# Patient Record
Sex: FEMALE | Race: WHITE | NOT HISPANIC OR LATINO | URBAN - METROPOLITAN AREA
[De-identification: names, ages, dates, MRNs, and addresses within clinical notes are randomized per-mention and may not be internally consistent; named-entity substitution may affect disease eponyms.]

---

## 2020-09-30 ENCOUNTER — OFFICE VISIT (OUTPATIENT)
Dept: URBAN - METROPOLITAN AREA CLINIC 128 | Facility: CLINIC | Age: 75
End: 2020-09-30
Payer: MEDICARE

## 2020-09-30 DIAGNOSIS — Z12.11 COLON CANCER SCREENING: ICD-10-CM

## 2020-09-30 DIAGNOSIS — A09 INFECTIOUS COLITIS: ICD-10-CM

## 2020-09-30 DIAGNOSIS — K60.2 ANAL FISSURE: ICD-10-CM

## 2020-09-30 DIAGNOSIS — K60.1 CHRONIC ANAL FISSURE: ICD-10-CM

## 2020-09-30 DIAGNOSIS — Z80.0 FAMILY HISTORY OF COLON CANCER: ICD-10-CM

## 2020-09-30 PROCEDURE — 46611 ANOSCOPY: CPT | Performed by: INTERNAL MEDICINE

## 2020-09-30 PROCEDURE — 99204 OFFICE O/P NEW MOD 45 MIN: CPT | Performed by: INTERNAL MEDICINE

## 2020-09-30 PROCEDURE — 99244 OFF/OP CNSLTJ NEW/EST MOD 40: CPT | Performed by: INTERNAL MEDICINE

## 2020-09-30 RX ORDER — SODIUM, POTASSIUM,MAG SULFATES 17.5-3.13G
354ML SOLUTION, RECONSTITUTED, ORAL ORAL
Qty: 354 MILLILITER | Refills: 0 | OUTPATIENT
Start: 2020-09-30

## 2020-09-30 NOTE — PHYSICAL EXAM GASTROINTESTINAL
Abdomen , soft, nontender, nondistended , no guarding or rigidity , no masses palpable , normal bowel sounds , Liver and Spleen , no hepatomegaly present.  Rectal , normal sphincter tone , no external hemorrhoids.  small anterior skin tag.  jeison with no anal fissure.  anoscopy with no noted fissure and grade II internal hemorrhoids.  no rectal masses or bleeding present

## 2020-09-30 NOTE — HPI-TODAY'S VISIT:
pt comes in for anal fissure/pain and change in bowel habits saw her pcp 1 month ago for rectal pain with exam noting rectal tear she was given a rectal ointment with some improvement  she comes in for abd pain wtih diarrhea 3 weeks she notes 1-2 bm/day that are soft no blood or mucus assoc abd cramping no fever or chills appetite is normal improving but not normal irritated by dairy improved with pepto stable weight  last colon in 2015 with diverticulosis and tortuous colon.  mom with colon cancer.  5 yr f/u given

## 2020-10-01 LAB
A/G RATIO: 2.1
ALBUMIN: 4.7
ALKALINE PHOSPHATASE: 81
ALT (SGPT): 18
AST (SGOT): 19
BASO (ABSOLUTE): 0
BASOS: 0
BILIRUBIN, TOTAL: 0.4
BUN/CREATININE RATIO: 33
BUN: 23
CALCIUM: 10.6
CARBON DIOXIDE, TOTAL: 25
CHLORIDE: 100
CREATININE: 0.7
EGFR IF AFRICN AM: 99
EGFR IF NONAFRICN AM: 86
EOS (ABSOLUTE): 0.2
EOS: 2
GLOBULIN, TOTAL: 2.2
GLUCOSE: 81
HEMATOCRIT: 39.9
HEMATOLOGY COMMENTS:: (no result)
HEMOGLOBIN: 13.5
IMMATURE CELLS: (no result)
IMMATURE GRANS (ABS): 0
IMMATURE GRANULOCYTES: 0
LYMPHS (ABSOLUTE): 2.1
LYMPHS: 31
MCH: 31.9
MCHC: 33.8
MCV: 94
MONOCYTES(ABSOLUTE): 0.5
MONOCYTES: 8
NEUTROPHILS (ABSOLUTE): 3.9
NEUTROPHILS: 59
NRBC: (no result)
PLATELETS: 212
POTASSIUM: 4.2
PROTEIN, TOTAL: 6.9
RBC: 4.23
RDW: 12.4
SODIUM: 140
WBC: 6.7

## 2020-10-05 ENCOUNTER — TELEPHONE ENCOUNTER (OUTPATIENT)
Dept: URBAN - METROPOLITAN AREA CLINIC 92 | Facility: CLINIC | Age: 75
End: 2020-10-05

## 2020-10-07 ENCOUNTER — OFFICE VISIT (OUTPATIENT)
Dept: URBAN - METROPOLITAN AREA LAB 2 | Facility: LAB | Age: 75
End: 2020-10-07
Payer: MEDICARE

## 2020-10-07 DIAGNOSIS — Z80.0 FAMILY HISTORY MALIGNANT NEOPLASM OF BILIARY TRACT: ICD-10-CM

## 2020-10-07 PROCEDURE — G0105 COLORECTAL SCRN; HI RISK IND: HCPCS | Performed by: INTERNAL MEDICINE

## 2020-12-02 ENCOUNTER — OFFICE VISIT (OUTPATIENT)
Dept: URBAN - METROPOLITAN AREA SURGERY CENTER 31 | Facility: SURGERY CENTER | Age: 75
End: 2020-12-02

## 2022-01-25 ENCOUNTER — OFFICE VISIT (OUTPATIENT)
Dept: URBAN - METROPOLITAN AREA CLINIC 19 | Facility: CLINIC | Age: 77
End: 2022-01-25
Payer: MEDICARE

## 2022-01-25 ENCOUNTER — WEB ENCOUNTER (OUTPATIENT)
Dept: URBAN - METROPOLITAN AREA CLINIC 19 | Facility: CLINIC | Age: 77
End: 2022-01-25

## 2022-01-25 ENCOUNTER — TELEPHONE ENCOUNTER (OUTPATIENT)
Dept: URBAN - METROPOLITAN AREA CLINIC 19 | Facility: CLINIC | Age: 77
End: 2022-01-25

## 2022-01-25 DIAGNOSIS — R13.19 ESOPHAGEAL DYSPHAGIA: ICD-10-CM

## 2022-01-25 PROCEDURE — 99204 OFFICE O/P NEW MOD 45 MIN: CPT | Performed by: INTERNAL MEDICINE

## 2022-01-25 RX ORDER — SODIUM, POTASSIUM,MAG SULFATES 17.5-3.13G
354ML SOLUTION, RECONSTITUTED, ORAL ORAL
Qty: 354 MILLILITER | Refills: 0 | Status: DISCONTINUED | COMMUNITY
Start: 2020-09-30

## 2022-01-25 RX ORDER — LEVOTHYROXINE SODIUM 50 UG/1
1 TABLET IN THE MORNING ON AN EMPTY STOMACH TABLET ORAL ONCE A DAY
Status: ACTIVE | COMMUNITY

## 2022-01-25 NOTE — HPI-TODAY'S VISIT:
Ms. Robles is a 76 yr old woman new patient referred by Dr.Ryan Cary . She had cervical fusion with plate placement Oct 2018. She had to have a followup surgery June 2020.  She has persistent neck pain and feels like a huge ball in her throat all the time.   removed two thyroid nodules a few years ago which made her swallowing intially better after her surgery until after her second neck surgery  when her swallowing.   Colonoscopy Oct 2020-  - diverticulosis of sigmoid colon. Family history of mother with colon cancer.

## 2022-01-27 ENCOUNTER — TELEPHONE ENCOUNTER (OUTPATIENT)
Dept: URBAN - METROPOLITAN AREA CLINIC 19 | Facility: CLINIC | Age: 77
End: 2022-01-27

## 2022-02-03 ENCOUNTER — LAB OUTSIDE AN ENCOUNTER (OUTPATIENT)
Dept: URBAN - METROPOLITAN AREA CLINIC 19 | Facility: CLINIC | Age: 77
End: 2022-02-03

## 2022-02-22 ENCOUNTER — OFFICE VISIT (OUTPATIENT)
Dept: URBAN - METROPOLITAN AREA CLINIC 19 | Facility: CLINIC | Age: 77
End: 2022-02-22

## 2022-02-23 ENCOUNTER — OFFICE VISIT (OUTPATIENT)
Dept: URBAN - METROPOLITAN AREA CLINIC 19 | Facility: CLINIC | Age: 77
End: 2022-02-23

## 2022-03-14 ENCOUNTER — OFFICE VISIT (OUTPATIENT)
Dept: URBAN - METROPOLITAN AREA CLINIC 19 | Facility: CLINIC | Age: 77
End: 2022-03-14

## 2022-09-12 ENCOUNTER — TELEPHONE ENCOUNTER (OUTPATIENT)
Dept: URBAN - METROPOLITAN AREA CLINIC 19 | Facility: CLINIC | Age: 77
End: 2022-09-12

## 2022-09-12 ENCOUNTER — OFFICE VISIT (OUTPATIENT)
Dept: URBAN - METROPOLITAN AREA CLINIC 19 | Facility: CLINIC | Age: 77
End: 2022-09-12
Payer: MEDICARE

## 2022-09-12 VITALS
TEMPERATURE: 98.4 F | HEART RATE: 69 BPM | HEIGHT: 63 IN | SYSTOLIC BLOOD PRESSURE: 124 MMHG | DIASTOLIC BLOOD PRESSURE: 68 MMHG

## 2022-09-12 DIAGNOSIS — K21.9 GERD: ICD-10-CM

## 2022-09-12 DIAGNOSIS — R13.12 OROPHARYNGEAL DYSPHAGIA: ICD-10-CM

## 2022-09-12 PROBLEM — 71457002 OROPHARYNGEAL DYSPHAGIA: Status: ACTIVE | Noted: 2022-09-12

## 2022-09-12 PROCEDURE — 99214 OFFICE O/P EST MOD 30 MIN: CPT | Performed by: INTERNAL MEDICINE

## 2022-09-12 RX ORDER — FAMOTIDINE 20 MG/1
1 TABLET AT BEDTIME AS NEEDED TABLET, FILM COATED ORAL ONCE A DAY
Qty: 90 TABLET | Refills: 1 | OUTPATIENT
Start: 2022-09-12

## 2022-09-12 RX ORDER — PANTOPRAZOLE SODIUM 40 MG/1
1 TABLET TABLET, DELAYED RELEASE ORAL
Qty: 180 TABLET | Refills: 1 | OUTPATIENT
Start: 2022-09-12

## 2022-09-12 RX ORDER — LEVOTHYROXINE SODIUM 50 UG/1
1 TABLET IN THE MORNING ON AN EMPTY STOMACH TABLET ORAL ONCE A DAY
Status: ACTIVE | COMMUNITY

## 2022-09-12 NOTE — HPI-TODAY'S VISIT:
9/30/20 (Dr. Osmar Monsivais): pt comes in for anal fissure/pain and change in bowel habits saw her pcp 1 month ago for rectal pain with exam noting rectal tear she was given a rectal ointment with some improvement  she comes in for abd pain wtih diarrhea 3 weeks she notes 1-2 bm/day that are soft no blood or mucus assoc abd cramping no fever or chills appetite is normal improving but not normal irritated by dairy improved with pepto stable weight  last colon in 2015 with diverticulosis and tortuous colon.  mom with colon cancer.  5 yr f/u given  1/25/22 (Dr. Jayne Stephenson): Ms. Robles is a 76 yr old woman new patient referred by Dr.Ryan Cary . She had cervical fusion with plate placement Oct 2018. She had to have a followup surgery June 2020.  She has persistent neck pain and feels like a huge ball in her throat all the time.   removed two thyroid nodules a few years ago which made her swallowing intially better after her surgery until after her second neck surgery  when her swallowing.   Colonoscopy Oct 2020-  - diverticulosis of sigmoid colon. Family history of mother with colon cancer.  9/12/22:  Patient presents today for evaluation of oropharyngeal dysphagia.  The patient had cervical spinal surgery that apparently caused her severe neck pain.  She saw Dr. Ren for the surgery, but she eventually saw Dr. Terrazas (sp?) and Dr. Tuttle.  Dr. Tuttle ordered multiple imaging studies - the patient reports that she was told that her neck pain was muscle strain (?) and required physical therapy.  Her physical therapist apparently was "very aggressive" and caused her tongue to lift up to the roof of her mouth (per patient).  She also started to have heartburn, which she had never had before (?).  She was given prescriptions for omeprazole and pantoprazole and told to take famotidine at night.  She now has difficulty swallowing, mainly pills.  She has no more heartburn.  She has hoarseness - used to see Dr. Puma Cary but has not seen him for months.

## 2022-09-16 ENCOUNTER — TELEPHONE ENCOUNTER (OUTPATIENT)
Dept: URBAN - METROPOLITAN AREA CLINIC 19 | Facility: CLINIC | Age: 77
End: 2022-09-16

## 2022-09-28 ENCOUNTER — LAB OUTSIDE AN ENCOUNTER (OUTPATIENT)
Dept: URBAN - METROPOLITAN AREA CLINIC 19 | Facility: CLINIC | Age: 77
End: 2022-09-28

## 2022-10-06 ENCOUNTER — OFFICE VISIT (OUTPATIENT)
Dept: URBAN - METROPOLITAN AREA CLINIC 19 | Facility: CLINIC | Age: 77
End: 2022-10-06
Payer: MEDICARE

## 2022-10-06 VITALS
HEIGHT: 63 IN | WEIGHT: 194.4 LBS | TEMPERATURE: 98 F | BODY MASS INDEX: 34.45 KG/M2 | SYSTOLIC BLOOD PRESSURE: 120 MMHG | DIASTOLIC BLOOD PRESSURE: 80 MMHG | HEART RATE: 61 BPM

## 2022-10-06 DIAGNOSIS — Q38.3 TONGUE ANOMALY: ICD-10-CM

## 2022-10-06 DIAGNOSIS — K21.9 GASTROESOPHAGEAL REFLUX DISEASE, UNSPECIFIED WHETHER ESOPHAGITIS PRESENT: ICD-10-CM

## 2022-10-06 PROBLEM — 235595009: Status: ACTIVE | Noted: 2022-10-06

## 2022-10-06 PROCEDURE — 99214 OFFICE O/P EST MOD 30 MIN: CPT | Performed by: INTERNAL MEDICINE

## 2022-10-06 RX ORDER — FAMOTIDINE 20 MG/1
1 TABLET AT BEDTIME AS NEEDED TABLET, FILM COATED ORAL ONCE A DAY
Qty: 90 TABLET | Refills: 1 | Status: ACTIVE | COMMUNITY
Start: 2022-09-12

## 2022-10-06 RX ORDER — PANTOPRAZOLE SODIUM 40 MG/1
1 TABLET TABLET, DELAYED RELEASE ORAL
Qty: 180 TABLET | Refills: 1 | Status: ACTIVE | COMMUNITY
Start: 2022-09-12

## 2022-10-06 RX ORDER — LEVOTHYROXINE SODIUM 50 UG/1
1 TABLET IN THE MORNING ON AN EMPTY STOMACH TABLET ORAL ONCE A DAY
Status: ACTIVE | COMMUNITY

## 2022-10-06 NOTE — HPI-TODAY'S VISIT:
Mrs. Robles is a 77 year old female who was last seen in GI clinic by Dr. Stephenson on 1/25/2022 and Dr. Melendez on 9/12/2022.   She had a MBS which showed "the patient exhibs normal oral phase and normal pharyngeal phases of swallowing. The patient has severa. complains which are unrelated. She believes that her tongue is possibly damaged from ACDF surgery. Explained that her speech is 100% intelligible and there is no evidence that her tongue has flo dysfunction. She does complain of globus which may be related to GERD. No reflux was noted on study."   She reports she is going to have lower back surgery on 11/10/2022. She reports she will be seeing Anesthesia for preop evaluation end of October 2022.   She wants to know how to help with night time reflux and food triggers which may induce reflux.   Prior history is summarized below: -1/25/22 (Dr. Jayne Stephenson): Ms. Robles is a 76 yr old woman new patient referred by Dr.Ryan Cary . She had cervical fusion with plate placement Oct 2018. She had to have a followup surgery June 2020.  She has persistent neck pain and feels like a huge ball in her throat all the time.   removed two thyroid nodules a few years ago which made her swallowing intially better after her surgery until after her second neck surgery  when her swallowing.  -Oct 2020 Colonoscopy-  - diverticulosis of sigmoid colon. -9/12/22:  Patient presents today for evaluation of oropharyngeal dysphagia.  The patient had cervical spinal surgery that apparently caused her severe neck pain.  She saw Dr. Ren for the surgery, but she eventually saw Dr. Terrazas (sp?) and Dr. Tuttle.  Dr. Tuttle ordered multiple imaging studies - the patient reports that she was told that her neck pain was muscle strain (?) and required physical therapy.  Her physical therapist apparently was "very aggressive" and caused her tongue to lift up to the roof of her mouth (per patient).  She also started to have heartburn, which she had never had before (?).  She was given prescriptions for omeprazole and   pantoprazole and told to take famotidine at night.  She now has difficulty swallowing, mainly pills.  She has no more heartburn.  She has hoarseness - used to see Dr. Puma Cary but has not seen him for months.

## 2022-11-29 ENCOUNTER — TELEPHONE ENCOUNTER (OUTPATIENT)
Dept: URBAN - METROPOLITAN AREA CLINIC 19 | Facility: CLINIC | Age: 77
End: 2022-11-29

## 2022-12-16 ENCOUNTER — OFFICE VISIT (OUTPATIENT)
Dept: URBAN - METROPOLITAN AREA CLINIC 19 | Facility: CLINIC | Age: 77
End: 2022-12-16

## 2022-12-29 ENCOUNTER — TELEPHONE ENCOUNTER (OUTPATIENT)
Dept: URBAN - METROPOLITAN AREA CLINIC 96 | Facility: CLINIC | Age: 77
End: 2022-12-29

## 2022-12-29 RX ORDER — PANTOPRAZOLE SODIUM 40 MG/1
1 TABLET TABLET, DELAYED RELEASE ORAL
Qty: 60 TABLET | Refills: 0
Start: 2022-09-12

## 2022-12-29 RX ORDER — FAMOTIDINE 20 MG/1
1 TABLET AT BEDTIME AS NEEDED TABLET, FILM COATED ORAL ONCE A DAY
Qty: 30 TABLET | Refills: 0
Start: 2022-09-12

## 2022-12-30 ENCOUNTER — TELEPHONE ENCOUNTER (OUTPATIENT)
Dept: URBAN - METROPOLITAN AREA CLINIC 19 | Facility: CLINIC | Age: 77
End: 2022-12-30

## 2022-12-30 RX ORDER — PANTOPRAZOLE SODIUM 40 MG/1
1 TABLET TABLET, DELAYED RELEASE ORAL TWICE A DAY
Qty: 60 | Refills: 0
Start: 2022-09-12

## 2022-12-30 RX ORDER — FAMOTIDINE 20 MG/1
1 TABLET TABLET, FILM COATED ORAL TWICE A DAY
Qty: 60 | Refills: 0
Start: 2022-09-12

## 2023-01-03 ENCOUNTER — TELEPHONE ENCOUNTER (OUTPATIENT)
Dept: URBAN - METROPOLITAN AREA CLINIC 19 | Facility: CLINIC | Age: 78
End: 2023-01-03

## 2023-01-03 ENCOUNTER — TELEPHONE ENCOUNTER (OUTPATIENT)
Dept: URBAN - METROPOLITAN AREA CLINIC 128 | Facility: CLINIC | Age: 78
End: 2023-01-03

## 2023-01-03 ENCOUNTER — ERX REFILL RESPONSE (OUTPATIENT)
Dept: URBAN - METROPOLITAN AREA CLINIC 19 | Facility: CLINIC | Age: 78
End: 2023-01-03

## 2023-01-03 RX ORDER — FAMOTIDINE 20 MG/1
1 TABLET TABLET, FILM COATED ORAL TWICE A DAY
Qty: 180 TABLET | Refills: 3 | OUTPATIENT

## 2023-01-03 RX ORDER — FAMOTIDINE 20 MG/1
TAKE ONE TABLET BY MOUTH ONE TIME DAILY AT BEDTIME AS NEEDED TABLET, FILM COATED ORAL
Qty: 90 TABLET | Refills: 3 | OUTPATIENT

## 2023-01-03 RX ORDER — FAMOTIDINE 20 MG/1
1 TABLET TABLET, FILM COATED ORAL TWICE A DAY
Qty: 60 | Refills: 0 | OUTPATIENT

## 2023-01-31 ENCOUNTER — DASHBOARD ENCOUNTERS (OUTPATIENT)
Age: 78
End: 2023-01-31

## 2023-02-06 ENCOUNTER — OFFICE VISIT (OUTPATIENT)
Dept: URBAN - METROPOLITAN AREA CLINIC 19 | Facility: CLINIC | Age: 78
End: 2023-02-06
Payer: MEDICARE

## 2023-02-06 DIAGNOSIS — K21.9 GERD: ICD-10-CM

## 2023-02-06 PROBLEM — 235595009 GASTROESOPHAGEAL REFLUX DISEASE: Status: ACTIVE | Noted: 2022-09-12

## 2023-02-06 PROCEDURE — 99213 OFFICE O/P EST LOW 20 MIN: CPT | Performed by: INTERNAL MEDICINE

## 2023-02-06 RX ORDER — PANTOPRAZOLE SODIUM 40 MG/1
1 TABLET TABLET, DELAYED RELEASE ORAL TWICE A DAY
Qty: 60 | Refills: 0 | Status: ACTIVE | COMMUNITY
Start: 2022-09-12

## 2023-02-06 RX ORDER — PANTOPRAZOLE SODIUM 40 MG/1
1 TABLET TABLET, DELAYED RELEASE ORAL TWICE A DAY
Qty: 180 | Refills: 3
Start: 2022-09-12

## 2023-02-06 RX ORDER — FAMOTIDINE 20 MG/1
TAKE ONE TABLET BY MOUTH ONE TIME DAILY AT BEDTIME AS NEEDED TABLET, FILM COATED ORAL
Qty: 90 TABLET | Refills: 3 | Status: ACTIVE | COMMUNITY

## 2023-02-06 RX ORDER — FAMOTIDINE 20 MG/1
TAKE ONE TABLET BY MOUTH ONE TIME DAILY AT BEDTIME AS NEEDED TABLET, FILM COATED ORAL ONCE A DAY
Qty: 90 | Refills: 3

## 2023-02-06 RX ORDER — LEVOTHYROXINE SODIUM 50 UG/1
1 TABLET IN THE MORNING ON AN EMPTY STOMACH TABLET ORAL ONCE A DAY
Status: ACTIVE | COMMUNITY

## 2023-02-06 NOTE — HPI-TODAY'S VISIT:
10/6/22 (Dr. Shree Smith): Mrs. Robles is a 77 year old female who was last seen in GI clinic by Dr. Stephenson on 1/25/2022 and Dr. Melendez on 9/12/2022.   She had a MBS which showed "the patient exhibs normal oral phase and normal pharyngeal phases of swallowing. The patient has severa. complains which are unrelated. She believes that her tongue is possibly damaged from ACDF surgery. Explained that her speech is 100% intelligible and there is no evidence that her tongue has flo dysfunction. She does complain of globus which may be related to GERD. No reflux was noted on study."   She reports she is going to have lower back surgery on 11/10/2022. She reports she will be seeing Anesthesia for preop evaluation end of October 2022.   She wants to know how to help with night time reflux and food triggers which may induce reflux.   Prior history is summarized below: -1/25/22 (Dr. Jayne Stephenson): Ms. Robles is a 76 yr old woman new patient referred by Dr.Ryan Cary . She had cervical fusion with plate placement Oct 2018. She had to have a followup surgery June 2020.  She has persistent neck pain and feels like a huge ball in her throat all the time.   removed two thyroid nodules a few years ago which made her swallowing intially better after her surgery until after her second neck surgery  when her swallowing.  -Oct 2020 Colonoscopy-  - diverticulosis of sigmoid colon. -9/12/22:  Patient presents today for evaluation of oropharyngeal dysphagia.  The patient had cervical spinal surgery that apparently caused her severe neck pain.  She saw Dr. Ren for the surgery, but she eventually saw Dr. Terrazas (sp?) and Dr. Tuttle.  Dr. Tuttle ordered multiple imaging studies - the patient reports that she was told that her neck pain was muscle strain (?) and required physical therapy.  Her physical therapist apparently was "very aggressive" and caused her tongue to lift up to the roof of her mouth (per patient).  She also started to have heartburn, which she had never had before (?).  She was given prescriptions for omeprazole and   pantoprazole and told to take famotidine at night.  She now has difficulty swallowing, mainly pills.  She has no more heartburn.  She has hoarseness - used to see Dr. Puma Cary but has not seen him for months.  2/6/23: Patient returns to my clinic for issues related to GERD. She states that she had back surgery recently, after which time she had significant dysgeusia and reflux. She actually feels better from that standpoint - back on pantoprazole/famotidine. Her other concern is intermittent right neck swelling and jaw pain. She has been cleared from Dr. Michael Harry for any vascular issues. She has not followed up with ENT - she has not been satisfied with ENT evaluations in the past. I suggested an oral surgeon - she would like a referral from her PCP.

## 2023-04-20 NOTE — PHYSICAL EXAM CHEST:
DISCHARGE from the Hospital to Home  Make sure you go home with all medication prescriptions.  Review your medication list with the nurse prior to leaving the hospital.  Schedule a post-op clinic appointment for 2-4 weeks after procedure. To make an appointment please call: [phone]    Patients will have Follow-Up Appointment post-surgery or procedure with a Physician Assistant or Advanced Nurse Practitioner.    POST-OP Surgical Incision and Puncture Site General Instructions  Incision Care: Please continue to shower daily when you get home. Gently pat the incision dry. Do not soak in the bathtub. Do not apply ointment or lotion to your incision.   Infection: Continue to monitor the incision daily for redness, drainage, or foul-smelling odor coming from the incision site. If any of these issues arise please call the office and request to speak to the RN or person on call if this is after hours.    For any incision, CALL if you develop a fever greater than 100°F  Restrictions: Continue to follow your post-operative restrictions of no bending, twisting, or lifting over 5-10 pounds for 7 days (depending on where your incision site is). Slowly advance your activity as tolerated.   Medication Refills: To request a refill of your prescription please call the office 2-3 days before your medication will run out. Our office hours are Monday-Friday 8:30am-5:00pm. All prescriptions will be filled within 48 hours. If a refill request is received on a Friday after 4:00pm it will be completed by 12:00pm the following Monday.    Radial Artery Puncture Sites (on the wrist):  Incision Care:  Bandage can be removed on Day 1 after procedure  You can shower 24 hours post procedure  Do not apply any powders, lotions, or ointments onto the site until it is healed  Contact our office if you have:  Redness, swelling, or drainage from the puncture site  Pain in your arm (tenderness is expected at the site)  Numbness or tingling of the  no lesions,  no deformities,  no traumatic injuries,  no significant scars are present,  chest wall non-tender,  no masses present, breathing is unlabored without accessory muscle use, normal breath sounds arm or hand  Develop a fever    Infection:    It is an EMERGENCY if you develop:  A sudden onset of pain or swelling at the puncture site  You can visibly see swelling in the wrist or up the arm  You should HOLD IMMEDIATE PRESSURE TO THE SITE and CALL 911    Restrictions:  NO soaking incision: no baths, swimming pools, hot tubs for 7 days  Minimize hand/wrist movement for 24 hours post procedure  NO heavy lifting greater than 5 pounds, pulling, repetitive movements of the affected wrist x 5 days

## 2024-08-29 ENCOUNTER — OFFICE VISIT (OUTPATIENT)
Dept: URBAN - METROPOLITAN AREA CLINIC 23 | Facility: CLINIC | Age: 79
End: 2024-08-29

## 2024-08-29 VITALS
BODY MASS INDEX: 26.58 KG/M2 | SYSTOLIC BLOOD PRESSURE: 129 MMHG | WEIGHT: 150 LBS | TEMPERATURE: 97.5 F | HEART RATE: 60 BPM | DIASTOLIC BLOOD PRESSURE: 80 MMHG | HEIGHT: 63 IN

## 2024-08-29 RX ORDER — FUROSEMIDE 10 MG/ML
2 ML SOLUTION ORAL ONCE A DAY
Status: ACTIVE | COMMUNITY

## 2024-08-29 RX ORDER — FAMOTIDINE 20 MG/1
TAKE ONE TABLET BY MOUTH ONE TIME DAILY AT BEDTIME AS NEEDED TABLET, FILM COATED ORAL ONCE A DAY
Qty: 90 | Refills: 3 | Status: ON HOLD | COMMUNITY

## 2024-08-29 RX ORDER — PANTOPRAZOLE SODIUM 40 MG/1
1 TABLET TABLET, DELAYED RELEASE ORAL TWICE A DAY
Qty: 180 | Refills: 3 | Status: ON HOLD | COMMUNITY
Start: 2022-09-12

## 2024-08-29 RX ORDER — MELOXICAM 15 MG/1
1 TABLET TABLET ORAL ONCE A DAY
Status: ACTIVE | COMMUNITY

## 2024-08-29 RX ORDER — LEVOTHYROXINE SODIUM 50 UG/1
1 TABLET IN THE MORNING ON AN EMPTY STOMACH TABLET ORAL ONCE A DAY
Status: ACTIVE | COMMUNITY

## 2024-08-29 RX ORDER — ALUMINUM ZIRCONIUM OCTACHLOROHYDREX GLY 16 G/100G
AS DIRECTED GEL TOPICAL
Status: ACTIVE | COMMUNITY

## 2024-08-29 RX ORDER — CLINDAMYCIN HYDROCHLORIDE 150 MG/1
3 CAPSULES CAPSULE ORAL
Status: ACTIVE | COMMUNITY

## 2024-08-29 RX ORDER — MAGNESIUM OXIDE/MAG AA CHELATE 300 MG
1 CAPSULE WITH A MEAL CAPSULE ORAL ONCE A DAY
Status: ACTIVE | COMMUNITY

## 2024-08-29 NOTE — HPI-TODAY'S VISIT:
79F On ozempic Cards--[facility]--Dr Fritz FH of CA colon--[facility] [place] COLON 2020--sigmoid tics. tortuous colon . Here to schedule screening colon Still very active Echo

## 2024-08-30 ENCOUNTER — OFFICE VISIT (OUTPATIENT)
Dept: URBAN - METROPOLITAN AREA CLINIC 19 | Facility: CLINIC | Age: 79
End: 2024-08-30

## 2025-08-21 ENCOUNTER — OFFICE VISIT (OUTPATIENT)
Dept: URBAN - METROPOLITAN AREA CLINIC 23 | Facility: CLINIC | Age: 80
End: 2025-08-21

## 2025-08-21 RX ORDER — FAMOTIDINE 20 MG/1
TAKE ONE TABLET BY MOUTH ONE TIME DAILY AT BEDTIME AS NEEDED TABLET, FILM COATED ORAL ONCE A DAY
Qty: 90 | Refills: 3 | Status: ON HOLD | COMMUNITY

## 2025-08-21 RX ORDER — PANTOPRAZOLE SODIUM 40 MG/1
1 TABLET TABLET, DELAYED RELEASE ORAL TWICE A DAY
Qty: 180 | Refills: 3 | Status: ON HOLD | COMMUNITY
Start: 2022-09-12

## 2025-08-21 RX ORDER — FUROSEMIDE 10 MG/ML
2 ML SOLUTION ORAL ONCE A DAY
Status: ACTIVE | COMMUNITY

## 2025-08-21 RX ORDER — MAGNESIUM OXIDE/MAG AA CHELATE 300 MG
1 CAPSULE WITH A MEAL CAPSULE ORAL ONCE A DAY
Status: ACTIVE | COMMUNITY

## 2025-08-21 RX ORDER — CLINDAMYCIN HYDROCHLORIDE 150 MG/1
3 CAPSULES CAPSULE ORAL
Status: ACTIVE | COMMUNITY

## 2025-08-21 RX ORDER — ALUMINUM ZIRCONIUM OCTACHLOROHYDREX GLY 16 G/100G
AS DIRECTED GEL TOPICAL
Status: ACTIVE | COMMUNITY

## 2025-08-21 RX ORDER — MELOXICAM 15 MG/1
1 TABLET TABLET ORAL ONCE A DAY
Status: ACTIVE | COMMUNITY

## 2025-08-21 RX ORDER — LEVOTHYROXINE SODIUM 50 UG/1
1 TABLET IN THE MORNING ON AN EMPTY STOMACH TABLET ORAL ONCE A DAY
Status: ACTIVE | COMMUNITY

## 2025-08-21 RX ORDER — ONDANSETRON 8 MG/1
1 TABLET ON THE TONGUE AND ALLOW TO DISSOLVE  AS NEEDED TABLET, ORALLY DISINTEGRATING ORAL
Qty: 30 | Refills: 1 | OUTPATIENT
Start: 2025-08-21

## 2025-08-28 ENCOUNTER — OFFICE VISIT (OUTPATIENT)
Dept: URBAN - METROPOLITAN AREA CLINIC 23 | Facility: CLINIC | Age: 80
End: 2025-08-28

## 2025-08-28 ENCOUNTER — OFFICE VISIT (OUTPATIENT)
Dept: URBAN - METROPOLITAN AREA CLINIC 12 | Facility: CLINIC | Age: 80
End: 2025-08-28